# Patient Record
Sex: FEMALE | Race: BLACK OR AFRICAN AMERICAN | Employment: FULL TIME | URBAN - METROPOLITAN AREA
[De-identification: names, ages, dates, MRNs, and addresses within clinical notes are randomized per-mention and may not be internally consistent; named-entity substitution may affect disease eponyms.]

---

## 2022-02-18 NOTE — PROGRESS NOTES
Claude Reynolds is a 27year old female No obstetric history on file. Patient's last menstrual period was 01/20/2022. Patient presents with:  Gyn Exam: ANNUAL EXAM  .     Her cycles are regular. She has no complaints. She is considering conceiving within the next 2 years. They are not currently trying that she had several questions about procreative management counseling. I advised her to take a prenatal vitamin when she is actively trying due to the folic acid can decrease decrease some risk of birth defects. I also recommended that she continue in her weight loss efforts and exercising into the best physical shape possible. I also recommended this because she has a slightly elevated blood pressure today of 139/85. Patient states that she is exercising regularly and is doing intermittent fasting and has lost 4 pounds since last month. Patient recently moved from the hospital to [place] and was a patient at [facility].  She is getting  this June. OBSTETRICS HISTORY:  OB History    No obstetric history on file. GYNE HISTORY:   Hx Prior Abnormal Pap: No  Pap Result Notes: LPS DONE ABOUT 2 YEARS AGO NORMAL PER PT      MEDICAL HISTORY:  History reviewed. No pertinent past medical history. History reviewed. No pertinent surgical history. SOCIAL HISTORY:  Social History    Socioeconomic History      Marital status: Single    Tobacco Use      Smoking status: Never Smoker      Smokeless tobacco: Never Used       FAMILY HISTORY:  History reviewed. No pertinent family history. MEDICATIONS:  No current outpatient medications on file. ALLERGIES:  No Known Allergies    Blood pressure 125/80, pulse 75, height 5' 3.5\" (1.613 m), weight 166 lb (75.3 kg), last menstrual period 01/20/2022.     Review of Systems:  Constitutional:  Denies fatigue, night sweats, hot flashes  Eyes:  denies blurred or double vision  Cardiovascular:  denies chest pain or palpitations  Respiratory:  denies shortness of breath  Gastrointestinal:  denies heartburn, abdominal pain, diarrhea or constipation  Genitourinary:  denies dysuria, incontinence, abnormal vaginal discharge, vaginal itching  Musculoskeletal:  denies back pain. Skin/Breast:  Denies any breast pain, lumps, or discharge. Neurological:  denies headaches, extremity weakness or numbness. Psychiatric: denies depression or anxiety. Endocrine:   denies excessive thirst or urination. Heme/Lymph:  denies history of anemia, easy bruising or bleeding. PHYSICAL EXAM:   Constitutional: well developed, well nourished  Head/Face: normocephalic  Neck/Thyroid: thyroid symmetric, no thyromegaly, no nodules, no adenopathy  Lymphatic:no abnormal supraclavicular or axillary adenopathy is noted  Breast: normal without palpable masses, tenderness, asymmetry, nipple discharge, nipple retraction or skin changes  Respiratory:  lungs clear to auscultation bilaterally  Cardiovascular: regular rate and rhythm, no significant murmur  Abdomen:  soft, nontender, nondistended, no masses  Skin/Hair: no unusual rashes or bruises  Extremities: no edema, no cyanosis  Psychiatric:  Oriented to time, place, person and situation.  Appropriate mood and affect    Pelvic Exam:  External Genitalia: normal appearance, hair distribution, and no lesions  Urethral Meatus:  normal in size, location, without lesions and prolapse  Bladder:  No fullness, masses or tenderness  Vagina:  Normal appearance without lesions, no abnormal discharge  Cervix:  Normal without tenderness on motion  Uterus: normal in size, contour, position, mobility, without tenderness  Adnexa: normal without masses or tenderness  Perineum: normal  Anus: no hemorroids     Assessment & Plan:    Encounter for gynecological examination without abnormal finding  (primary encounter diagnosis)  Procreative management counseling  Elevated blood pressure reading in office without diagnosis of hypertension  ASCCP guidelines discussed,angela done,rtc 1 year for annual exam  Call with missed menses for blood pregnancy test    No orders of the defined types were placed in this encounter.       Requested Prescriptions      No prescriptions requested or ordered in this encounter       None

## 2022-02-24 NOTE — TELEPHONE ENCOUNTER
Confirms +HPT and LMP on 1/20/22 with regular cycles every 24-25 days. 5w0d advised of rotating male and female practice. Advised first appt is with a nurse over the phone. Confirms taking PNV with DHA and FA. Pt states took plan B on 2/7 after having unprotected IC and asking for a pregnancy test prior to OBN appt. States has been experiencing \"very mild period like abdominal cramping\" Denies bleeding or spotting. Denies pain now. Pt advised to monitor symptoms and let us know if above occurs or in increased in abdominal cramping. Pt advised will send msg to CELSO on-call and let her know. Pt ok with plan and states understanding.

## 2022-02-25 NOTE — TELEPHONE ENCOUNTER
+HPT with LMP on 1/20/2022. Pt mycharting regarding abdominal and back cramping 3/10. pt states the cramps feel like she is going to start her period. Denies any VB, spotting, UTI or constipation issues. Pt state she has only had 2 bottles of water all day today. Pt informed to increase water to 64 ozs and Tylenol for pain. Bleeding and pain precautions given. Prior notes indicate pt took Plan B on 2/7 after unprotected IC, but was unaware she was pregnant. To [address] on-call to please review. Thank you.

## 2022-02-25 NOTE — TELEPHONE ENCOUNTER
Spoke to Nadege Guerrero on-call regarding pt complaints, agrees with triage advise given. States if pt c/o spotting or bleeding to do Quant HCG's x2. MBT: O positive from records dated 4/6/2021 in care everywhere.

## 2022-02-26 NOTE — TELEPHONE ENCOUNTER
Notified pt of [facility] recs below. Pt agrees and will do the lab this weekend. Await result.

## 2022-02-26 NOTE — TELEPHONE ENCOUNTER
After re-consideration, since pt took plan B, that could change ovulation timing. Please order quant now to see if matches LMP dating. If it does, nothing further to do. If does not, repeat in 48 hours to see if increases correctly.

## 2022-03-03 NOTE — TELEPHONE ENCOUNTER
----- Message from Wei Marie MD sent at 3/3/2022  9:33 AM CST -----  Will need ultrasound for dating in 3 weeks

## 2022-03-04 ENCOUNTER — TELEPHONE (OUTPATIENT)
Dept: OBGYN CLINIC | Facility: CLINIC | Age: 27
End: 2022-03-04

## 2022-03-04 DIAGNOSIS — O26.859 SPOTTING IN EARLY PREGNANCY: ICD-10-CM

## 2022-03-04 DIAGNOSIS — Z34.90 PREGNANCY, UNSPECIFIED GESTATIONAL AGE: Primary | ICD-10-CM

## 2022-03-04 NOTE — TELEPHONE ENCOUNTER
Called and spoke to [facility] on-call, [facility] reviewing communication routed to her and pts chart. TORB for pt to have HCG level drawn today. Pt has order for ultrasound placed, have pt call CS to schedule ultrasound in the next 2 wks. RN reviewed ultrasound order, noted incorrect ultrasound order placed, cancelled previous order and placed 1st trimester ob ultrasound with EV. Pt called and informed of Quant HCG that needs to be done today, given number to CS and encouraged to call today for appt in the next 2 wks. Bleeding and pain precautions again reviewed. Pt states understanding.

## 2022-03-04 NOTE — TELEPHONE ENCOUNTER
6w1d based on LMP of 1/20/2022. Pt calling today stating she had some lightheadedness this morning that has resolved since eating. Pt informed that with pregnancy your can have some lightheadedness with the surge of hormones. Instructed to make sure to drink at least 64 oz of water daily, have 5-6 small meals throughout the day and change positions slowly. Pt instructed to reach out if having return dizziness or passes out. Pt states she had spotting on Monday, Tuesday and again today. Pt states it is light pink/brownish smear only post voiding. States she does not need to wear pad. Pt denies BRB, flow, clots or abdominal cramping. Pt does states she has had daily lower back pain that last for 30 mins only and then goes away since spotting started on Monday. Pt states no IC since early last week. Denies any UTI s/s and states regular BM's without strain, daily. Pt is hydrating properly. Pt informed will reach out to MD on-call for recs, bleeding and pain precautions given. Pt had HCG level done on 2/28 which was 5,619 due to previous back cramping and having had take Plan B on 2/7 after unprotected IC before she realized she was pregnant. MBT: O positive according to records dated 4/6/21 on Care Everywhere. To Daniela Gordillo on-call to review and advise. Thank you.

## 2022-03-10 NOTE — TELEPHONE ENCOUNTER
Sent to [facility], MD on Call, to review ob ultrasound. Hcg on 2/28 was 5,619. Hcg on 3/4/22 15,959. Took Plan B on 2/7. OB ultrasound on 3/8/22, 5w6d. Pt has her OBN PC appt today.

## 2022-03-10 NOTE — PROGRESS NOTES
Pt seen for OBN  PC appt today with no complaints. Normal PN labs ordered, including sickle cell and varicella . Pt advised all labs must be completed and resulted prior to MD appt. Pt took Plan B on 2/7. HCGS AND OB ultrasound in the computer. Ht is 5'3.5  Wt is 168  BMI 29.3         Pt informed to have labs done and available before her New OB MD appt. Pt stated she could have labs done at [facility] lab. When answering for genetics for herself, she informed that her mother is adopted. Pt had ob ultrasound done 3/8 and hcg done in computer. Partner's name is  Sobeida Ferguson,  contact # cell - [phone],  Race: Black   Occupation: , Race:  Black         MEDICAL HISTORY    Anemia No    Anesthetic complications No    Anxiety/Depression  No    Autoimmune Disorder No    Asthma  Yes Mild   Cancer No    Diabetes  No    Gyne/breast Surgery No    Heart Disease No    Hepatitis/Liver Disease  No    History of blood transfusion No    History of abnormal pap No    Hypertension  No    Infertility  No    Kidney Disease/Frequent UTIs  No    Medication Allergies No    Latex Allergies No    Food Allergies  No    Neurological Disorder/Epilepsy No    Operations/Hospitalizations No    TB exposure  No    Thyroid Dysfunction No    Trauma/Violence  No    Uterine Anomaly  No    Uterine Fibroids  No    Variocosities/DVTs No    Smoker No    Drug usage in prior year No    Alcohol No    Would you accept a blood transfusion? If no, are you a Zoroastrian?  Yes    No            INFECTION HISTORY    Chlamydia No    Pt or partner have hx of Genital Herpes No    Gonorrhea No    Hepatitis B No    HIV No    HPV No    MRSA No    Syphilis No    Tattoos No    Live with someone or Exposed to TB No    Rash or viral illness since LMP  No    Varicella No    Recent Travel (or planned travel) to [facility] for self and or partner No    Pets No        GENETICS SCREENING    Genetic Screening    Genetic Screening/Teratology Counseling- Includes patient, baby's father, or anyone in either family with:  Patient's age 28 years or older as of estimated date of delivery: No   Thalassemia ([facility] [place], [address], or  background): MCV less than 80: No   Neural tube defect (Meningomyelocele, Spina bifida, or Anencephaly): No   Congenital heart defect: No   Down syndrome: No   Mike-Sachs (Ashkenazi Mormonism, [place], [place]): No   Canavan disease (Ashkenazi Mormonism): No   Familial dysautonomia (Ashkenazi Mormonism): No   Sickle cell disease or trait (): No (Comment: Pt and parter are black )   Hemophilia or other blood disorders: No   Muscular dystrophy: No    Cystic fibrosis: No   Xander's chorea: No   Intellectual disability and/or autism: No   Other inherited genetic or chromosomal disorder: No   Maternal metabolic disorder (eg. Type 1 diabetes, PKU): No   Patient or baby's father had child with birth defects not listed above: No   Recurrent pregnancy loss, or a stillbirth: No   Medications (including supplements, vitamins, herbs, or OTC drugs)/illicit/recreational drugs/alcohol since last menstrual period: Yes   If yes, agent(s) and strength/dosage: Pnv with dha     Pt's mother is adopted. Mother does not know her genetic history. MISC    Infant vaccinations  Yes         Pt. Has answered NO 5P questions and has NO  risk factors. Pt. Given What pregnant women need to know handout.

## 2022-06-13 NOTE — TELEPHONE ENCOUNTER
From: [id number]  To: Maggie Sparrow. MD Cecilia  Sent: 6/13/2022 7:40 AM CDT  Subject: Yeast Infection    Hello! Is it safe to use Monistat while pregnant? I would be using the vaginal insert. Thanks!     Simón

## 2022-07-31 NOTE — PROGRESS NOTES
Otf at visit. I discussed missed appointments and need to get level 1 US done ASAP. She also needs to get 2T labs done within the 2 weeks. Two week interval now x 5 visits.

## 2022-08-16 NOTE — TELEPHONE ENCOUNTER
----- Message from Basilio Washington DO sent at 8/16/2022  7:31 AM CDT -----  Chris Gr, unfortunately you failed the glucose screen. This does not mean you are diabetic but we have to take the next test which is 3 hours with a total of 4 blood draws. This will be a fasting test.  I'll ask our Nurses to generate order and let you know.

## 2022-08-25 NOTE — PROGRESS NOTES
+FM, denies contractions, maybe some BH, denies lof or bleeding. No recent illness  Passed 3 hr gtt.  Desires tdap today  rev'd kick counts, classes, sx of PTL  rto 2 weeks

## 2022-10-24 NOTE — TELEPHONE ENCOUNTER
From: Leola Brown  To: Antonino Saeed. MD Cecilia  Sent: 10/24/2022 9:34 AM CDT  Subject: Amniotic Fluid or No?    Hello,    I am not sure if I am leaking amniotic fluid or if it is just REALLY wet and slightly mucous-ey discharge. It is able to make it's way down my thing but not roll down like water. There is Las white discharge in it. Just wanted your thoughts.     Thanks,    Simón

## 2022-10-24 NOTE — TELEPHONE ENCOUNTER
Call placed to patient. 38w5d reports increased vaginal discharge since early last week. She is unsure if LOF of vaginal discharge. Consistency is thin, not liquid, tinged white, increased quantity x 1 week. +FM, denies contractions or VB. Directed patient to place dry pad and be up and moving x 1 hour. Patient to call back with status in 1 hour. Patient verbalized understanding.

## 2022-10-27 NOTE — PROGRESS NOTES
Called patient per [facility], to recheck her blood pressure, spoke to patient on her way back to recheck bp manually, pt understands.

## 2022-10-27 NOTE — PROGRESS NOTES
BP noted to be elevated and patient left prior to repeat taken. LMTCB regarding returning for BP check.  MA notified to follow-up for returning for repeat BP check

## 2022-10-29 PROBLEM — Z37.9 NORMAL LABOR: Status: ACTIVE | Noted: 2022-10-29

## 2022-10-29 PROBLEM — Z37.9 NORMAL LABOR (HCC): Status: ACTIVE | Noted: 2022-10-29

## 2022-10-29 NOTE — PROGRESS NOTES
Pt is a 32year old female admitted to TR2/TR2-A. Patient presents with:  R/o Labor: Pt. Reports having u/c since 2330 on 10/28/22     Pt is  39w3d intra-uterine pregnancy. History obtained, pt. Oriented to room, staff, and plan of care.

## 2022-10-29 NOTE — ANESTHESIA PROCEDURE NOTES
Labor Analgesia    Date/Time: 10/29/2022 11:00 AM  Performed by: Ricardo Gudino MD  Authorized by: Ricardo Gudino MD       General Information and Staff    Start Time:  10/29/2022 11:00 AM  End Time:  10/29/2022 11:15 AM  Anesthesiologist:  Ricardo Gudino MD  Performed by: Anesthesiologist  Patient Location:  OB  Reason for Block: labor epidural    Preanesthetic Checklist: patient identified, IV checked, site marked, risks and benefits discussed, monitors and equipment checked, pre-op evaluation, timeout performed, IV bolus, anesthesia consent and sterile technique used      Procedure Details    Patient Position:  Sitting  Prep: Betadine, ChloraPrep and patient draped    Monitoring:  Heart rate, cardiac monitor and continuous pulse ox  Approach:  Midline    Epidural Needle    Injection Technique:  MANUELA saline  Needle Type:  Tuohy  Needle Gauge:  18 G  Needle Length:  3.5 in  Needle Insertion Depth:  7  Location:  L4-5    Spinal Needle    Needle Type:  Pencil-tip  Needle Gauge:  27 G  Needle Length:  3.5 in    Catheter    Catheter Type:  End hole  Catheter Size:  20 G  Catheter at Skin Depth:  15  Test Dose:  Negative    Assessment    Sensory Level:  T4    Additional Comments     Epidural placed easily. No heme. No CSF in epidural catheter or Tuohy needle.

## 2022-10-29 NOTE — DISCHARGE SUMMARY
[facility]    Discharge Summary    Simón Olivas Patient Status:  Inpatient    6/15/1995 MRN [id number]   Location [address] Attending Clarita Fabry, MD   [facility] Day # 0       Admission Date:  10/29/2022    Discharge Date: 10/31/2022    Delivering OB Clinician:  Dr Demetrius Wyatt    [facility]: Estimated Date of Delivery: 22    Gestational Age: 38w3d    Antepartum complications: Patient Active Problem List:     Normal labor      Date of Delivery: 10/29/2022 Time of Delivery: 2:09 PM    Delivery Type: primary  section, low transverse incision    Baby: Liveborn female Information for the patient's : Ijeoma Ivy, Girl [[id number]]   8 lb 5.3 oz (3.78 kg)  Apgars:  1 minute: 8  5 minutes: 910 minutes:         Hospital Course: Presents with labor and SROM. Temp 100.5 orally and 101 axillary. Fetal tachycardia in 200s with variables. IV antibiotics x 24 hrs delivery. No complications.  Routine delivery and postpartum care    Discharged Condition: stable    Disposition: home    Plan:     Follow-up appointment in 6 weeks with Dr. Demetrius Wyatt

## 2022-10-29 NOTE — PROGRESS NOTES
10/29/2022, 1:29 PM    Subjective:    Comfortable with epidural.  IUPC that was placed earlier came out. Maternal pulse 120-130s  Oral temp 100.5  Axillary temp 101  Pt was given ampicillin and tylenol. Awaiting gent. FHT 200s for over an hour with occasional decel    Objective:   10/29/22  1115 10/29/22  1130 10/29/22  1145 10/29/22  1215   BP: 143/36 116/49 126/63    Pulse: (!) 128 (!) 124 116    Resp:       Temp:       TempSrc:       SpO2: 100% 100% 100%    Weight:    201 lb (91.2 kg)   Height:    5' 3.5\" (1.613 m)     No intake or output data in the 24 hours ending 10/29/22 1329    Fetal heart tones:  FHT :  200s with min-mod variability  Decelerations: variables  Contractions: q 3-4 min    Cervical Exam:  9/C/0  Attempt to reinsert IUPC unsuccessful    Assessment:  Fetal tachycardia. Probable chorioamnionitis    Plan: Will proceed with . Pt understands the risks of surgery including risk of infection, bleeding, and injury to major organs.   Pt understands and agrees to proceed      Deloris Steward MD 10/29/2022 1:29 PM

## 2022-10-29 NOTE — OPERATIVE REPORT
[facility]     Section Delivery / Operative Note    Myranda Barone Patient Status:  Inpatient    6/15/1995 MRN [id number]   Location [address] Attending Osei Momin MD   Hosp Day # 0 PCP No primary care provider on file. Date of Surgery:  10/29/2022    Pre Op Diagnosis:  IUP at 39 3/7 wks, Fetal tachycardia, probable early chorioamnionitis    Post Op Diagnosis:  same as pre-op    Procedure:  primary low transverse     Surgeon:  Reyes Corn, MD    Assistant Surgeon:  Dr Donald Slaughter     Anesthesia: epidural    Complications: none    Antibiotics:  Ancef 2 grams preoperatively    QBL:  750 cc    Sponge and Needle Counts:  Verified    Specimens:  Cord gases. Placenta     Findings:  Live female infant,Weight 8#5 and Apgars 8 & 9,  Delivered in OP position. Nuchal Cord: none  meconium amniotic fluid noted. Normal postpartum uterus, tubes, ovaries. Normal intact placenta. Indications:  32year old   Presents with SROM and labor. Then approximately 3 hrs after admission, patient started have tachycardia and later with increased temp of 100.5 orally and 101.5 axillary. Fetal tachycardia in 200s with occasional variable decels. Operative note: The patient was prepped and draped in a sterile manner. A time out was performed. After adequate level of anesthesia was ascertained, a Pfannenstiel skin incision was made and extended down to the level of the rectus fascia. The rectus fascia was incised and extended bilaterally with curved Moss scissors. The rectus muscles were dissected from the rectus fascia superiorly and inferiorly. The peritoneal cavity was entered uneventfully. The peritoneum overlying the lower uterine segment was incised and the bladder flap was created. A transverse uterine incision was made. The fetal vertex was delivered with the surgeon's hand and mild fundal pressure. The oropharynx was bulb suctioned.   The remainder of the body was delivered without complication. After delayed cord clamping of 30 seconds, the cord was doubly clamped and cut. The baby was handed off to the awaiting neonatologist.  The placenta was delivered spontaneously. The uterus was closed in two layer fashion. The first layer was closed with continuous locking stitch using a 0-Vicryl. This was followed by an imbricating layer using a continuous suture of 0-Vicryl. Additional sutures were placed for hemostasis. Good hemostasis was noted. The fascia was closed with a continuous suture of 0-Vicryl starting at either ends and meeting in the midline. The subcutaneous tissue was copiously irrigated and any bleeding cauterized. The subcutaneous tissue was closed using 3-0 Plain. The skin was closed using subcuticular suture of 4-0 Vicryl. Steri Strips placed. The final needle, sponge, and instrument counts were correct.  cc. There were no complications. Collins was draining clear urine. The patient tolerated the procedure well and was taken to recovery room in stable fashion.       Junior Ray MD  10/29/2022  2:54 PM

## 2022-10-30 NOTE — LACTATION NOTE
LACTATION NOTE - MOTHER      Evaluation Type: Inpatient    Problems identified  Problems identified: Knowledge deficit    Maternal history  Maternal history: Caesarean section  Other/comment: asthma    Breastfeeding goal  Breastfeeding goal: To maintain breast milk feeding per patient goal    Maternal Assessment  Bilateral Breasts: Soft;Symmetrical  Bilateral Nipples: WNL; Colostrum easily expressed  Prior breastfeeding experience (comment below): Primip  Breastfeeding Assistance: Breastfeeding assistance provided with permission    Pain assessment  Location/Comment: denies  Treatment of Sore Nipples: Deeper latch techniques    Guidelines for use of:  Current use of pump[de-identified] not currently indicated

## 2022-10-30 NOTE — LACTATION NOTE
This note was copied from a baby's chart. LACTATION NOTE - INFANT    Evaluation Type  Evaluation Type: Inpatient    Problems & Assessment  Problems Diagnosed or Identified: Latch difficulty;  feeding problem;Sleepy  Problems: comment/detail: LC visit, parent led BF attempt  Muscle tone: Appropriate for GA    Feeding Assessment  Summary Current Feeding: Breastfeeding exclusively  Breastfeeding Assessment: Assisted with breastfeeding w/mother's permission;Nipple shield initiated with non-nutritive suckling  Breastfeeding lasted # of minutes: 30  Breastfeeding Positions: right breast;left breast;laid back;football  Latch: Repeated attempts, hold nipple in mouth, stimulate to suck  Audible Sucks/Swallows: None  Type of Nipple: Everted (after stimulation)  Comfort (Breast/Nipple): Soft/non-tender  Hold (Positioning): Full assist, teach one side, mother does other, staff holds  [facility] Score: 6  Other (comment): Discussed use of nipple shield/pumping if latch difficulty persists.  Last feeding ~4 hours prior

## 2022-10-30 NOTE — PROGRESS NOTES
Late entry    Pt transferred from RR 3 to 360 via cart with infant in arms in stable condition. Pt denies any pain at this time. IV intact with LR infusing at 125 mL/hr on pump. Jayson care completed, and underwear and jayson pad applied to pt. Pt care endorsed to Marco A Huertas RN at this time.

## 2022-10-31 PROBLEM — Z37.9 NORMAL LABOR: Status: RESOLVED | Noted: 2022-10-29 | Resolved: 2022-10-31

## 2022-10-31 PROBLEM — Z37.9 NORMAL LABOR (HCC): Status: RESOLVED | Noted: 2022-10-29 | Resolved: 2022-10-31

## 2022-10-31 NOTE — LACTATION NOTE
Lactation discharge instructions reviewed with pt and significant other. Pt verbalizes understanding of feeding frequency, monitoring of output. Denies any latch discomfort. Encouraged to call if desired for final latch assessment prior to discharge, call [address] [place] [address] lactation department as needed post discharge for support. LACTATION NOTE - MOTHER      Evaluation Type: Inpatient    Problems identified  Problems identified: Knowledge deficit         Breastfeeding goal  Breastfeeding goal: To maintain breast milk feeding per patient goal    Maternal Assessment  Bilateral Breasts: Soft;Symmetrical  Bilateral Nipples:  WNL    Pain assessment  Location/Comment: denies    Guidelines for use of:  Breast pump type: Ameda Platinum  Current use of pump[de-identified] overnight X 2 when formula given  Suggested use of pump: Avoid overstimulation of milk supply;Pump if infant is not latching to breast  Reported pumping volumes (ml): gtts

## 2023-05-17 NOTE — TELEPHONE ENCOUNTER
Pt is at pharmacy now - states ear drops are too expensive and is asking if they can be changed to something else - no answer on nurse line

## 2023-05-18 NOTE — TELEPHONE ENCOUNTER
tobradex drops are still over the $100 , pt has deductible and will need to pay out of pocket, less expensive medications is cortisporin and ofloxacin.  Please advise

## 2023-05-18 NOTE — TELEPHONE ENCOUNTER
Message to prescriber   Co pay is $135.00 patient would like something less expensive for  CIPRO/DEXAMETH 0.3-0.1% OTIC SUSP  SHAKE LIQUID AND INSTILL 5 DROPS IN BOTH EARS EVERY 12 HOURS FOR 10 DAYS

## 2023-07-31 ENCOUNTER — OFFICE VISIT (OUTPATIENT)
Dept: FAMILY MEDICINE CLINIC | Facility: CLINIC | Age: 28
End: 2023-07-31
Payer: COMMERCIAL

## 2023-07-31 VITALS
RESPIRATION RATE: 18 BRPM | OXYGEN SATURATION: 99 % | HEART RATE: 92 BPM | HEIGHT: 63 IN | SYSTOLIC BLOOD PRESSURE: 112 MMHG | TEMPERATURE: 98 F | WEIGHT: 179.63 LBS | DIASTOLIC BLOOD PRESSURE: 68 MMHG | BODY MASS INDEX: 31.83 KG/M2

## 2023-07-31 DIAGNOSIS — J02.9 ACUTE PHARYNGITIS, UNSPECIFIED ETIOLOGY: ICD-10-CM

## 2023-07-31 DIAGNOSIS — R05.1 ACUTE COUGH: ICD-10-CM

## 2023-07-31 DIAGNOSIS — J02.9 SORE THROAT: Primary | ICD-10-CM

## 2023-07-31 LAB
CONTROL LINE PRESENT WITH A CLEAR BACKGROUND (YES/NO): YES YES/NO
KIT LOT #: NORMAL NUMERIC
STREP GRP A CUL-SCR: NEGATIVE

## 2023-07-31 PROCEDURE — 87637 SARSCOV2&INF A&B&RSV AMP PRB: CPT | Performed by: NURSE PRACTITIONER

## 2023-07-31 PROCEDURE — 87081 CULTURE SCREEN ONLY: CPT | Performed by: NURSE PRACTITIONER

## 2023-07-31 RX ORDER — AMOXICILLIN 500 MG/1
500 CAPSULE ORAL 2 TIMES DAILY
Qty: 20 CAPSULE | Refills: 0 | Status: SHIPPED | OUTPATIENT
Start: 2023-07-31 | End: 2023-08-10

## 2023-08-01 LAB
FLUAV + FLUBV RNA SPEC NAA+PROBE: NEGATIVE
FLUAV + FLUBV RNA SPEC NAA+PROBE: NEGATIVE
RSV RNA SPEC NAA+PROBE: NEGATIVE
SARS-COV-2 RNA RESP QL NAA+PROBE: NOT DETECTED

## 2024-02-12 ENCOUNTER — OFFICE VISIT (OUTPATIENT)
Dept: OBGYN CLINIC | Facility: CLINIC | Age: 29
End: 2024-02-12
Payer: COMMERCIAL

## 2024-02-12 VITALS
SYSTOLIC BLOOD PRESSURE: 114 MMHG | WEIGHT: 164.63 LBS | DIASTOLIC BLOOD PRESSURE: 74 MMHG | BODY MASS INDEX: 29 KG/M2 | HEART RATE: 69 BPM

## 2024-02-12 DIAGNOSIS — N64.4 BREAST PAIN, RIGHT: ICD-10-CM

## 2024-02-12 DIAGNOSIS — N64.52 BLOODY DISCHARGE FROM RIGHT NIPPLE: ICD-10-CM

## 2024-02-12 DIAGNOSIS — Z01.419 WELL WOMAN EXAM WITH ROUTINE GYNECOLOGICAL EXAM: Primary | ICD-10-CM

## 2024-02-12 DIAGNOSIS — N94.6 DYSMENORRHEA: ICD-10-CM

## 2024-02-12 PROCEDURE — 3078F DIAST BP <80 MM HG: CPT | Performed by: NURSE PRACTITIONER

## 2024-02-12 PROCEDURE — 99395 PREV VISIT EST AGE 18-39: CPT | Performed by: NURSE PRACTITIONER

## 2024-02-12 PROCEDURE — 3074F SYST BP LT 130 MM HG: CPT | Performed by: NURSE PRACTITIONER

## 2024-02-12 NOTE — PROGRESS NOTES
[facility]    Obstetrics and Gynecology    Chief Complaint   Patient presents with    Physical     Annual/ pt reports  right breast pain/nipple discharge onset 3 weeks. 14 months post breast feeding       Simón Olivas is a 28 year old female  Patient's last menstrual period was 2024 (exact date). presenting for annual gynecology exam.  Last seen in 2022 at postpartum visit.  She breast fed her daughter for 3 months.  She had been wearing a spots bra and noticed breast pain on right breast in RUIQ about 3 weeks ago. Still persisting. A few nights ago uncomfortable to sleep. she also noticed some drainage from right breast when daughter was leaning on breast - white milk but also noticed red streaks from breast. Only comes out with squeezing, Pain comes and goes. No redness or warmth, no mass palpated, no skin changes.  She reports periods every month, lasts 5-6 days, no abnormal bleeding. Has some back pain on left side with period and cramping.    No pain or bleeding with sex. Using withdrawal method. Considering another pregnancy in about 2 years.    Pap:2022 NILM; due in    Contraception: withdrawl  Mammo: none  Colonoscopy: n/a      OBSTETRICS HISTORY:  OB History    Para Term  AB Living   1 1 1 0 0 1   SAB IAB Ectopic Multiple Live Births   0 0 0 0 1       GYNE HISTORY:  Menarche: 10/9 YEARS OLD (2024  3:05 PM)  Period Cycle (Days): monthly (2024  3:05 PM)  Period Duration (Days): 5-6 days (2024  3:05 PM)  Period Flow: Heavy-light (2024  3:05 PM)  Use of Birth Control (if yes, specify type): Withdrawal (2024  3:05 PM)  Hx Prior Abnormal Pap: No (2024  3:05 PM)  Pap Date: 22 (2024  3:05 PM)  Pap Result Notes: NEG PAP (2024  3:05 PM)      History   Sexual Activity    Sexual activity: Not on file           Latest Ref Rng & Units 2022    11:15 AM   RECENT PAP RESULTS   Thinprep Pap Negative for intraepithelial  lesion or malignancy Negative for intraepithelial lesion or malignancy          MEDICAL HISTORY:  Past Medical History:   Diagnosis Date    Asthma      History reviewed. No pertinent surgical history.    SOCIAL HISTORY:  Social History     Socioeconomic History    Marital status:      Spouse name: Not on file    Number of children: Not on file    Years of education: Not on file    Highest education level: Not on file   Occupational History    Not on file   Tobacco Use    Smoking status: Never    Smokeless tobacco: Never   Vaping Use    Vaping Use: Never used   Substance and Sexual Activity    Alcohol use: Yes     Comment: Socially before pre    Drug use: Never    Sexual activity: Not on file   Other Topics Concern    Not on file   Social History Narrative    Not on file     Social Determinants of Health     Financial Resource Strain: Not on file   Food Insecurity: Not on file   Transportation Needs: Not on file   Physical Activity: Not on file   Stress: Not on file   Social Connections: Not on file   Housing Stability: Not on file         Depression Screening (PHQ-2/PHQ-9): Over the LAST 2 WEEKS   Little interest or pleasure in doing things (over the last two weeks)?: Not at all    Feeling down, depressed, or hopeless (over the last two weeks)?: Not at all    PHQ-2 SCORE: 0           FAMILY HISTORY:  History reviewed. No pertinent family history.    MEDICATIONS:    Current Outpatient Medications:     albuterol 108 (90 Base) MCG/ACT Inhalation Aero Soln, Inhale 1-2 puffs into the lungs every 6 (six) hours as needed., Disp: , Rfl:     ALLERGIES:    Allergies   Allergen Reactions    Cherry OTHER (SEE COMMENTS)     Cheeks became tingling and cold    Seasonal ITCHING         Review of Systems:  Constitutional:  Denies fatigue, night sweats, hot flashes  Eyes:  denies blurred or double vision  Cardiovascular:  denies chest pain or palpitations  Respiratory:  denies shortness of breath  Gastrointestinal:  denies  heartburn, abdominal pain, diarrhea or constipation  Genitourinary:  denies dysuria, incontinence, abnormal vaginal discharge, vaginal itching,   Musculoskeletal:  denies back pain   Skin/Breast:  +right nipple discharge and right breast pain  Neurological:  denies headaches, extremity weakness or numbness.  Psychiatric: denies depression or anxiety.  Endocrine:   denies excessive thirst or urination.  Heme/Lymph:  denies history of anemia, easy bruising or bleeding.      PHYSICAL EXAM:     Vitals:    02/12/24 1506   BP: 114/74   Pulse: 69   Weight: 164 lb 9.6 oz (74.7 kg)       Body mass index is 29.16 kg/m².     Constitutional: well developed, well nourished  Psychiatric:  Oriented to time, place, person and situation. Appropriate mood and affect  Head/Face: normocephalic  Neck/Thyroid: thyroid symmetric, no thyromegaly, no nodules, no adenopathy  Lymphatic:no abnormal supraclavicular or axillary adenopathy is noted  Breast: right breast area at 1 oclock tender to palpation of RUIQ, pink discharge from right nipple appreciated with expression; left breast normal without palpable masses, tenderness, asymmetry, nipple discharge, nipple retraction or skin changes  Abdomen:  soft, nontender, nondistended, no masses  Skin/Hair: no unusual rashes or bruises  Extremities: no edema, no cyanosis    Pelvic Exam:  External Genitalia: normal appearance, hair distribution, and no lesions  Urethral Meatus:  normal in size, location, without lesions and prolapse  Bladder:  No fullness, masses or tenderness  Vagina:  Normal appearance without lesions, no abnormal discharge  Cervix:  Normal without tenderness on motion  Uterus: normal in size, contour, position, mobility, without tenderness  Adnexa: normal without masses or tenderness  Perineum: normal  Anus: no hemorroids     Assessment & Plan:    ICD-10-CM    1. Well woman exam with routine gynecological exam  Z01.419       2. Breast pain, right  N64.4 MAIRA MANA 2D+3D DIAGNOSTIC  MAIRA  BILAT (MQA=[id number])      3. Bloody discharge from right nipple  N64.52 MAIRA MANA 2D+3D DIAGNOSTIC MAIRA  BILAT (CPT=[id number])      4. Dysmenorrhea  N94.6 US PELVIS W EV (POI=[id number])         Reviewed ASCCP guidelines with the patient   Pap due in 2025  Contraception: Using withdrawal  Breast pain and right pink/bloody discharge with expression - bilateral diagnostic mammogram and US ordered. Refer to breast specialist. Advised to call if any worsening symptoms  Left low back pain with period - US ordered  Breast Health:     Reviewed current guidelines with the patient and to start Mammograms at age 40  Reviewed monthly self breast exams with the patient   Discussed diet, exercise, MVIs with Ca/Vit D  Follow up in 1 yr for STANLEY Martinez    This note was prepared using Dragon Medical voice recognition dictation software. As a result errors may occur. When identified these errors have been corrected. While every attempt is made to correct errors during dictation discrepancies may still exist.

## 2024-02-19 ENCOUNTER — TELEPHONE (OUTPATIENT)
Dept: SURGERY | Facility: CLINIC | Age: 29
End: 2024-02-19

## 2024-02-19 NOTE — TELEPHONE ENCOUNTER
Chelsea preferred      Consult  Myriam Abraham referring  RT Breast nipple bleeding  She will complete her DX mammo order and await a call from our office for her appt.

## 2024-03-04 ENCOUNTER — HOSPITAL ENCOUNTER (OUTPATIENT)
Dept: MAMMOGRAPHY | Facility: HOSPITAL | Age: 29
Discharge: HOME | End: 2024-03-04
Attending: NURSE PRACTITIONER
Payer: COMMERCIAL

## 2024-03-04 ENCOUNTER — HOSPITAL ENCOUNTER (OUTPATIENT)
Dept: ULTRASOUND IMAGING | Facility: HOSPITAL | Age: 29
Discharge: HOME | End: 2024-03-04
Attending: NURSE PRACTITIONER
Payer: COMMERCIAL

## 2024-03-04 DIAGNOSIS — N64.4 BREAST PAIN, RIGHT: ICD-10-CM

## 2024-03-04 DIAGNOSIS — N64.52 BLOODY DISCHARGE FROM RIGHT NIPPLE: ICD-10-CM

## 2024-03-04 PROCEDURE — 77062 BREAST TOMOSYNTHESIS BI: CPT | Performed by: NURSE PRACTITIONER

## 2024-03-04 PROCEDURE — 77066 DX MAMMO INCL CAD BI: CPT | Performed by: NURSE PRACTITIONER

## 2024-03-04 PROCEDURE — 76642 ULTRASOUND BREAST LIMITED: CPT | Performed by: NURSE PRACTITIONER

## 2024-03-12 ENCOUNTER — TELEPHONE (OUTPATIENT)
Dept: SURGERY | Facility: CLINIC | Age: 29
End: 2024-03-12

## 2024-04-03 ENCOUNTER — OFFICE VISIT (OUTPATIENT)
Dept: SURGERY | Facility: CLINIC | Age: 29
End: 2024-04-03
Payer: COMMERCIAL

## 2024-04-03 VITALS
SYSTOLIC BLOOD PRESSURE: 118 MMHG | DIASTOLIC BLOOD PRESSURE: 82 MMHG | WEIGHT: 163.81 LBS | HEIGHT: 63 IN | RESPIRATION RATE: 18 BRPM | BODY MASS INDEX: 29.02 KG/M2 | OXYGEN SATURATION: 98 % | HEART RATE: 76 BPM | TEMPERATURE: 98 F

## 2024-04-03 DIAGNOSIS — N64.4 BREAST PAIN, RIGHT: ICD-10-CM

## 2024-04-03 DIAGNOSIS — N64.52 DISCHARGE FROM RIGHT NIPPLE: Primary | ICD-10-CM

## 2024-04-03 NOTE — PROGRESS NOTES
Breast Surgery New Patient Consultation    This is the first visit for this 28 year old woman, referred by Myriam Tamayo, who presents for evaluation of right breast pain and expressible nipple discharge.    History of Present Illness:   Ms. Simón Olivas is a 28 year old woman who presents with initial concerns of pain to her right breast.  The patient said a couple months ago she had some mild pain in the upper right breast and during a self-exam was able to elicit a discharge from the right nipple.  She denies any spontaneous or bloody discharge.  She denies any discrete palpable mass, skin changes or x-ray symptoms.  She has no known family history of breast cancer.  She has no personal prior history of breast disease or biopsies.  In light of her new clinical symptoms she was referred for bilateral diagnostic evaluation on 2024 and found to have no mammographic or ultrasound correlate for her concerns.  She is here today for evaluation and recommendations for further therapy.        Past Medical History:   Diagnosis Date    Asthma (HCC)        No past surgical history on file.    Gynecological History:  Pt is a   Pt was 27 years old at time of first pregnancy.    She has cumulative breastfeeding history of 3 months.  She achieved menarche at age 9 and LMP 3/31/2024.  She denies any history of hormone replacement therapy.  She denies history of oral contraceptive use.  She denies infertility treatment to achieve pregnancy.    Medications:    No outpatient medications have been marked as taking for the 4/3/24 encounter (Appointment) with Sharri Cunha MD.       Allergies:    Allergies   Allergen Reactions    Cherry OTHER (SEE COMMENTS)     Cheeks became tingling and cold    Seasonal ITCHING       Family History:   No family history on file.    She is not of Ashkenazi Sikhism ancestry.    Social History:  History   Alcohol Use    Yes     Comment: Socially before pre       History    Smoking Status    Never   Smokeless Tobacco    Never     Ms. Simón Olivas is  with 1 children. She has 3 siblings. She is currently Employed full-time     Review of Systems:  General:   The patient denies, fever, chills, night sweats, fatigue, generalized weakness, change in appetite or weight loss.    HEENT:     The patient denies eye irritation, cataracts, redness, glaucoma, yellowing of the eyes, change in vision, color blindness, or wearing contacts/glasses. The patient denies hearing loss, ringing in the ears, ear drainage, earaches, nasal congestion, nose bleeds, snoring, pain in mouth/throat, hoarseness, change in voice, facial trauma.    Respiratory:  The patient denies chronic cough, phlegm, hemoptysis, pleurisy/chest pain, pneumonia, asthma, wheezing, difficulty in breathing with exertion, emphysema, chronic bronchitis, shortness of breath or abnormal sound when breathing.     Cardiovascular:  There is no history of chest pain, chest pressure/discomfort, palpitations, irregular heartbeat, fainting or near-fainting, difficulty breathing when lying flat, SOB/Coughing at night, swelling of the legs or chest pain while walking.    Breasts:  See history of present illness    Gastrointestinal:     There is no history of difficulty or pain with swallowing, reflux symptoms, vomiting, dark or bloody stools, constipation, yellowing of the skin, indigestion, nausea, change in bowel habits, diarrhea, abdominal pain or vomiting blood.     Genitourinary:  The patient denies frequent urination, needing to get up at night to urinate, urinary hesitancy or retaining urine, painful urination, urinary incontinence, decreased urine stream, blood in the urine or vaginal/penile discharge.    Skin:    The patient denies rash, itching, skin lesions, dry skin, change in skin color or change in moles.     Hematologic/Lymphatic:  The patient denies easily bruising or bleeding or persistent swollen glands or  lymph nodes.     Musculoskeletal:  The patient denies muscle aches/pain, joint pain, stiff joints, neck pain, back pain or bone pain.    Neuropsychiatric:  There is no history of migraines or severe headaches, seizure/epilepsy, speech problems, coordination problems, trembling/tremors, fainting/black outs, dizziness, memory problems, loss of sensation/numbness, problems walking, weakness, tingling or burning in hands/feet. There is no history of abusive relationship, bipolar disorder, sleep disturbance, anxiety, depression or feeling of despair.    Endocrine:    There is no history of poor/slow wound healing, weight loss/gain, fertility or hormone problems, cold intolerance, thyroid disease.     Allergic/Immunologic:  There is no history of hives, hay fever, angioedema or anaphylaxis.    /82 (BP Location: Left arm, Patient Position: Sitting, Cuff Size: adult)   Pulse 76   Temp 98.3 °F (36.8 °C) (Temporal)   Resp 18   Ht 1.6 m (5' 3\")   Wt 74.3 kg (163 lb 12.8 oz)   LMP 02/07/2024 (Exact Date)   SpO2 98%   BMI 29.02 kg/m²     Physical Exam:  The patient is an alert, oriented, well-nourished and  well-developed woman who appears her stated age. Her speech patterns and movements are normal. Her affect is appropriate.    HEENT: The head is normocephalic. The neck is supple. The thyroid is not enlarged and is without palpable masses/nodules. There are no palpable masses. The trachea is in the midline. Conjunctiva are clear, non-icteric.    Chest: The chest expands symmetrically. The lungs are clear to auscultation.    Heart: The rhythm is regular.  There are no murmurs, rubs, gallops or thrills.    Breasts:  Her breasts are symmetrical with a cup size 36D.  Right breast: The skin, nipple ,and areola appear normal. There is no skin dimpling with movement of the pectoralis. There is no nipple retraction. No nipple discharge can be elicited. The parenchyma is mildly nodular. There are no dominant masses in the  breast. The axillary tail is normal.  Left breast:   The skin, nipple, and areola appear normal. There is no skin dimpling with movement of the pectoralis. There is no nipple retraction. No nipple discharge can be elicited. The parenchyma is mildly nodular. There are no dominant masses in the breast. The axillary tail is normal.    Abdomen:  The abdomen is soft, flat and non tender. The liver is not enlarged. There are no palpable masses.    Lymph Nodes:  The supraclavicular, axillary and cervical regions are free of significant lymphadenopathy.    Back: There is no vertebral column tenderness.    Skin: The skin appears normal. There are no suspicious appearing rashes or lesions.    Extremities: The extremities are without deformity, cyanosis or edema.    Impression:   Ms. Simón Olivas is a 28 year old woman presents with concerns related to right breast pain and expressible discharge from the right nipple with no imaging correlate.    Discussion and Plan:  I had a discussion with the Patient regarding her breast exam. On exam today I found no obvious clinical evidence of malignancy bilaterally.  I first reviewed the recent imaging we discussed this at length.  The patient reports some tenderness in the lower outer quadrant of her right breast on exam today.  She has previously had ultrasound evaluation of the subareolar as well as the upper inner quadrant.  I recommended targeted ultrasound of this area of focal discomfort to ensure there were no concerning findings in this location.    Targeted right breast ultrasound  Targeted ultrasound of the right breast at 8:00 in the lower outer quadrant was performed at her area of tenderness.  She was found to have an incidental small cysts x 2 measuring 2 mm each without suspicious characteristics and without any adjacent solid masses.    I explained that simple cysts are a common finding in pre-menopausal women and that no specific intervention is  universally warranted. I did explain that if a cyst enlarges or becomes focally symptomatic, occasionally interventions such as aspiration may be performed for transient relief of symptoms. I discussed that the relief is transient as the wall of the cyst maintains its integrity and therefore fluid tends to re-accumulate within the cyst within the next cycle. Excision of cysts is not recommended as the risk of the procedure is generally greater than the benefit of excising the cyst. In addition, simple cysts are not known to be precursor lesions and therefore there is no increased risk for the development of breast cancer in the future.     In the absence of any spontaneous and/or bloody discharge and the lack of any dilated ducts or suspicious findings on her clinical exam today I do not recommend any further interrogation of the nipple discharge.  We discussed if this becomes spontaneous or bloody we would entertain a possible breast MRI for further evaluation.  As long as the patient's clinical exam remains stable anticipate she will not need regular breast imaging until she turns 38.  She was given ample opportunity for questions and those questions were answered to her satisfaction. She has been  encouraged to contact the office with any questions or concerns as needed related to her breast health.    This note was created by Dragon voice recognition. Errors in content may be related to improper recognition by the system; efforts to review and correct have been done but errors may still exist. Please be advised the primary purpose of this note is for me to communicate medical care. Standard sentence structure is not always used. Medical terminology and medical abbreviations may be used. There may be grammatical, typographical, and automated fill ins that may have errors missed in proofreading.

## 2024-04-06 PROBLEM — N64.52 DISCHARGE FROM RIGHT NIPPLE: Status: ACTIVE | Noted: 2024-04-06

## 2024-04-06 PROBLEM — N64.4 BREAST PAIN, RIGHT: Status: ACTIVE | Noted: 2024-04-06

## 2024-10-17 ENCOUNTER — HOSPITAL ENCOUNTER (OUTPATIENT)
Age: 29
Discharge: HOME | End: 2024-10-17
Payer: COMMERCIAL

## 2024-10-17 VITALS
DIASTOLIC BLOOD PRESSURE: 68 MMHG | TEMPERATURE: 98 F | SYSTOLIC BLOOD PRESSURE: 122 MMHG | HEART RATE: 76 BPM | RESPIRATION RATE: 18 BRPM | OXYGEN SATURATION: 99 %

## 2024-10-17 DIAGNOSIS — S83.92XA SPRAIN OF LEFT KNEE, UNSPECIFIED LIGAMENT, INITIAL ENCOUNTER: ICD-10-CM
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
## 2024-10-17 PROCEDURE — 99213 OFFICE O/P EST LOW 20 MIN: CPT | Performed by: NURSE PRACTITIONER

## 2024-10-17 RX ORDER — CYCLOBENZAPRINE HCL 10 MG
10 TABLET ORAL 3 TIMES DAILY PRN
Qty: 10 TABLET | Refills: 0 | Status: SHIPPED | OUTPATIENT
Start: 2024-10-17
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
## 2024-10-17 NOTE — DISCHARGE INSTRUCTIONS
Take ibuprofen as needed for pain.  Use the muscle relaxer to help with any tightness.  This may make you sleepy so make sure you do not take it prior to going to work, drinking alcohol, driving a vehicle.  Apply ice to your sore areas.  Drink plenty of water.  Gentle stretching.  Make sure to keep moving.  Expect to be sore for the next few days.  Follow-up with your primary doctor if no improvement

## 2025-04-19 NOTE — PROGRESS NOTES
Chief Complaint:   Chief Complaint   Patient presents with    Physical       HPI:   This is a 29 year old female coming in for physical. She feels generally well. Due for pap smear, no hx of abnormal pap. Started POP this past week. Periods have been regular. She is interested in starting colon cancer screening early, no symptoms or family history. Working on exercising.    Results for orders placed or performed in visit on 07/31/23   POC Rapid Strep [[id number]]    Collection Time: 07/31/23  2:56 PM   Result Value Ref Range    Strep Grp A Screen Negative Negative    Control Line Present with a clear background (yes/no) Yes Yes/No    Kit Lot # [id number] Numeric    Kit Expiration Date 04/14/24 Date   SARS-CoV-2/Flu A and B/RSV by PCR ([facility]) [E] *Collect in Office!    Collection Time: 07/31/23  3:14 PM    Specimen: Nasopharyngeal swab; Other   Result Value Ref Range    SARS-CoV-2 (COVID-19) - ([facility]) Not Detected Not Detected    Influenza A by PCR Negative Negative    Influenza B by PCR Negative Negative    RSV by PCR Negative Negative   Grp A Strep Cult, Throat [E]    Collection Time: 07/31/23  3:14 PM    Specimen: Throat; Other   Result Value Ref Range    Strep Culture No Beta Hemolytic Strep Isolated              Past Medical History[1]  Past Surgical History[2]  Social History:  Short Social Hx on File[3]  Family History:  Family History[4]  Allergies:  Allergies[5]  Current Meds:  Current Medications[6]   Counseling given: Not Answered       REVIEW OF SYSTEMS:   CONSTITUTIONAL:  Denies unusual weight gain/loss, fever, chills, or fatigue.  HEENT:  Eyes:  Denies eye pain, visual loss, blurred vision. Denies hearing loss, sneezing, congestion, runny nose or sore throat.  INTEGUMENTARY:  Denies rashes, itching, skin lesion.  CARDIOVASCULAR:  Denies chest pain, palpitations, edema, dyspnea on exertion or at rest.  RESPIRATORY:  Denies shortness of breath, wheezing, cough or sputum.  GASTROINTESTINAL:  Denies abdominal  pain, nausea, vomiting, constipation, diarrhea, or blood in stool.  GENITOURINARY: Denies dysuria, hematuria, frequency.  MUSCULOSKELETAL:  Denies weakness, muscle aches, back pain, joint pain, swelling or stiffness.  NEUROLOGICAL:  Denies headache, seizures, dizziness.  PSYCHIATRIC:  Denies depression or anxiety. Denies suicidal thoughts.    EXAM:   /70 (BP Location: Right arm, Patient Position: Sitting, Cuff Size: large)   Pulse 104   Ht 5' 3\" (1.6 m)   Wt 167 lb (75.8 kg)   LMP 04/08/2025 (Approximate)   SpO2 98%   BMI 29.58 kg/m²  Estimated body mass index is 29.58 kg/m² as calculated from the following:    Height as of this encounter: 5' 3\" (1.6 m).    Weight as of this encounter: 167 lb (75.8 kg).   Vital signs reviewed.Appears stated age, well groomed, in no acute distress.  Physical Exam:  GEN:  Patient is alert, awake and oriented, well developed, well nourished.  HEENT:  Head:  Normocephalic, atraumatic Eyes: EOMI, PERRLA, conjunctivae clear bilaterally, no eye discharge Ears: External normal, TM clear bilaterally. Nose: patent, no nasal discharge. Throat:  No tonsillar erythema or exudate.  Mouth:  No oral lesions, good dentition.  NECK: Supple, no CLAD, no thyromegaly.  SKIN: No rashes, no skin lesion, no bruising, good turgor.  HEART:  Regular rate and rhythm, no murmurs, rubs or gallops.  LUNGS: Clear to auscultation bilterally, no rales/rhonchi/wheezing.  BREASTS: Symmetrical, no palpable lump or axillary lymphadenopathy BL, no nipple discharge/retraction or skin changes BL.   ABDOMEN:  Soft, nondistended, nontender, bowel sounds normal in all 4 quadrants, no masses, no hepatosplenomegaly.  : External normal. Vaginal mucosa moist and pink. Small amount of thicker white discharge present. Cervix without lesions or CMT.  BACK: No tenderness to palpation, FROM.  EXTREMITIES:  No edema, no cyanosis, no clubbing, FROM, 2+ dorsalis pedis pulses bilaterally.  NEURO:  No deficit, normal gait,  strength and tone, sensory intact.    ASSESSMENT AND PLAN:   1. Adult general medical examination  -Healthy diet and regular exercise.  -Annual eye exam and biannual dental visits.  -Labs as below.  - CBC With Differential With Platelet; Future  - Comp Metabolic Panel (14); Future  - Hemoglobin A1C; Future  - Lipid Panel; Future  - Assay, Thyroid Stim Hormone; Future    2. Screening for cervical cancer  -Discussed routine screening interval for normal pap.  - ThinPrep PAP Smear; Future  - Hpv Dna  High Risk , Thin Prep Collect; Future    3. Screening for malignant neoplasm of colon  -Discussed that insurance may not approve this due to age, but will refer to GI for initial consult.  - Gastro Referral - In Network      Meds & Refills for this Visit:  Requested Prescriptions      No prescriptions requested or ordered in this encounter         Problem List:  Problem List[7]    Shauna Vieira, APRN  2025  8:57 AM         [1]   Past Medical History:   Asthma (HCC)   [2]   Past Surgical History:  Procedure Laterality Date         [3]   Social History  Socioeconomic History    Marital status:    Tobacco Use    Smoking status: Never    Smokeless tobacco: Never   Vaping Use    Vaping status: Never Used   Substance and Sexual Activity    Alcohol use: Yes     Comment: Socially    Drug use: Never   [4] History reviewed. No pertinent family history.  [5]   Allergies  Allergen Reactions    Cherry OTHER (SEE COMMENTS)     Cheeks became tingling and cold    Seasonal ITCHING   [6]   Current Outpatient Medications   Medication Sig Dispense Refill    Norgestrel (OPILL) 0.075 MG Oral Tab Take by mouth.     [7]   Patient Active Problem List  Diagnosis    Discharge from right nipple

## (undated) NOTE — LETTER
VACCINE ADMINISTRATION RECORD  PARENT / GUARDIAN APPROVAL  Date: 2022  Vaccine administered to: Michel Zhong     : 6/15/1995    MRN: [id number]    A copy of the appropriate Centers for Disease Control and Prevention Vaccine Information statement has been provided. I have read or have had explained the information about the diseases and the vaccines listed below. There was an opportunity to ask questions and any questions were answered satisfactorily. I believe that I understand the benefits and risks of the vaccine cited and ask that the vaccine(s) listed below be given to me or to the person named above (for whom I am authorized to make this request). VACCINES ADMINISTERED:  TDAP    I have read and hereby agree to be bound by the terms of this agreement as stated above. My signature is valid until revoked by me in writing. This document is signed by SELF, relationship: SELF on 2022.:                                                                                                                                         Parent / Guardian Signature                                                Date    Sheron Grant served as a witness to authentication that the identity of the person signing electronically is in fact the person represented as signing.